# Patient Record
Sex: FEMALE | Race: BLACK OR AFRICAN AMERICAN | NOT HISPANIC OR LATINO | Employment: FULL TIME | URBAN - METROPOLITAN AREA
[De-identification: names, ages, dates, MRNs, and addresses within clinical notes are randomized per-mention and may not be internally consistent; named-entity substitution may affect disease eponyms.]

---

## 2023-03-01 LAB
ALANINE AMINOTRANSFERASE (SGPT) (U/L) IN SER/PLAS: 19 U/L (ref 7–45)
ALBUMIN (G/DL) IN SER/PLAS: 4 G/DL (ref 3.4–5)
ALKALINE PHOSPHATASE (U/L) IN SER/PLAS: 51 U/L (ref 33–110)
ANION GAP IN SER/PLAS: 12 MMOL/L (ref 10–20)
ASPARTATE AMINOTRANSFERASE (SGOT) (U/L) IN SER/PLAS: 13 U/L (ref 9–39)
BILIRUBIN TOTAL (MG/DL) IN SER/PLAS: 0.3 MG/DL (ref 0–1.2)
CALCIDIOL (25 OH VITAMIN D3) (NG/ML) IN SER/PLAS: 39 NG/ML
CALCIUM (MG/DL) IN SER/PLAS: 9.1 MG/DL (ref 8.6–10.6)
CARBON DIOXIDE, TOTAL (MMOL/L) IN SER/PLAS: 27 MMOL/L (ref 21–32)
CHLORIDE (MMOL/L) IN SER/PLAS: 104 MMOL/L (ref 98–107)
CHOLESTEROL (MG/DL) IN SER/PLAS: 192 MG/DL (ref 0–199)
CHOLESTEROL IN HDL (MG/DL) IN SER/PLAS: 66 MG/DL
CHOLESTEROL/HDL RATIO: 2.9
CREATININE (MG/DL) IN SER/PLAS: 1.1 MG/DL (ref 0.5–1.05)
ESTIMATED AVERAGE GLUCOSE FOR HBA1C: 111 MG/DL
GFR FEMALE: 66 ML/MIN/1.73M2
GLUCOSE (MG/DL) IN SER/PLAS: 98 MG/DL (ref 74–99)
HEMOGLOBIN A1C/HEMOGLOBIN TOTAL IN BLOOD: 5.5 %
LDL: 110 MG/DL (ref 0–99)
POTASSIUM (MMOL/L) IN SER/PLAS: 4.3 MMOL/L (ref 3.5–5.3)
PROTEIN TOTAL: 6.9 G/DL (ref 6.4–8.2)
SODIUM (MMOL/L) IN SER/PLAS: 139 MMOL/L (ref 136–145)
TRIGLYCERIDE (MG/DL) IN SER/PLAS: 79 MG/DL (ref 0–149)
UREA NITROGEN (MG/DL) IN SER/PLAS: 18 MG/DL (ref 6–23)
VLDL: 16 MG/DL (ref 0–40)

## 2023-03-10 ENCOUNTER — TELEPHONE (OUTPATIENT)
Dept: PRIMARY CARE | Facility: CLINIC | Age: 38
End: 2023-03-10
Payer: COMMERCIAL

## 2023-03-10 DIAGNOSIS — J45.909 ASTHMA, UNSPECIFIED ASTHMA SEVERITY, UNSPECIFIED WHETHER COMPLICATED, UNSPECIFIED WHETHER PERSISTENT (HHS-HCC): Primary | ICD-10-CM

## 2023-03-10 RX ORDER — NEBULIZER AND COMPRESSOR
1 EACH MISCELLANEOUS 2 TIMES DAILY PRN
Qty: 1 EACH | Refills: 0 | Status: SHIPPED | OUTPATIENT
Start: 2023-03-10

## 2023-05-03 ENCOUNTER — OFFICE VISIT (OUTPATIENT)
Dept: PRIMARY CARE | Facility: CLINIC | Age: 38
End: 2023-05-03
Payer: COMMERCIAL

## 2023-05-03 VITALS
WEIGHT: 265 LBS | SYSTOLIC BLOOD PRESSURE: 118 MMHG | OXYGEN SATURATION: 98 % | BODY MASS INDEX: 42.59 KG/M2 | HEART RATE: 78 BPM | DIASTOLIC BLOOD PRESSURE: 78 MMHG | HEIGHT: 66 IN

## 2023-05-03 DIAGNOSIS — R51.9 CHRONIC NONINTRACTABLE HEADACHE, UNSPECIFIED HEADACHE TYPE: ICD-10-CM

## 2023-05-03 DIAGNOSIS — G47.19 EXCESSIVE DAYTIME SLEEPINESS: ICD-10-CM

## 2023-05-03 DIAGNOSIS — R51.9 NONINTRACTABLE HEADACHE, UNSPECIFIED CHRONICITY PATTERN, UNSPECIFIED HEADACHE TYPE: Primary | ICD-10-CM

## 2023-05-03 DIAGNOSIS — R06.83 SNORING: ICD-10-CM

## 2023-05-03 DIAGNOSIS — I10 BENIGN ESSENTIAL HYPERTENSION: ICD-10-CM

## 2023-05-03 DIAGNOSIS — G89.29 CHRONIC NONINTRACTABLE HEADACHE, UNSPECIFIED HEADACHE TYPE: ICD-10-CM

## 2023-05-03 PROBLEM — B37.31 VAGINAL YEAST INFECTION: Status: RESOLVED | Noted: 2023-05-03 | Resolved: 2023-05-03

## 2023-05-03 PROBLEM — R53.83 FATIGUE: Status: ACTIVE | Noted: 2023-05-03

## 2023-05-03 PROBLEM — K43.9 VENTRAL HERNIA: Status: ACTIVE | Noted: 2023-05-03

## 2023-05-03 PROBLEM — K85.80 OTHER ACUTE PANCREATITIS WITHOUT NECROSIS OR INFECTION (HHS-HCC): Status: RESOLVED | Noted: 2023-05-03 | Resolved: 2023-05-03

## 2023-05-03 PROBLEM — E55.9 VITAMIN D DEFICIENCY: Status: ACTIVE | Noted: 2023-05-03

## 2023-05-03 PROBLEM — R06.2 WHEEZE: Status: ACTIVE | Noted: 2023-05-03

## 2023-05-03 PROBLEM — J45.909 ASTHMA (HHS-HCC): Status: ACTIVE | Noted: 2023-05-03

## 2023-05-03 PROBLEM — R73.03 PREDIABETES: Status: ACTIVE | Noted: 2023-05-03

## 2023-05-03 PROBLEM — L30.9 ECZEMA: Status: ACTIVE | Noted: 2023-05-03

## 2023-05-03 PROCEDURE — 3078F DIAST BP <80 MM HG: CPT | Performed by: PHYSICIAN ASSISTANT

## 2023-05-03 PROCEDURE — 3074F SYST BP LT 130 MM HG: CPT | Performed by: PHYSICIAN ASSISTANT

## 2023-05-03 PROCEDURE — 1036F TOBACCO NON-USER: CPT | Performed by: PHYSICIAN ASSISTANT

## 2023-05-03 PROCEDURE — 99214 OFFICE O/P EST MOD 30 MIN: CPT | Performed by: PHYSICIAN ASSISTANT

## 2023-05-03 RX ORDER — ERGOCALCIFEROL 1.25 MG/1
1 CAPSULE ORAL
COMMUNITY

## 2023-05-03 RX ORDER — AMLODIPINE BESYLATE 5 MG/1
5 TABLET ORAL DAILY
COMMUNITY
Start: 2023-04-06

## 2023-05-03 RX ORDER — MOMETASONE FUROATE AND FORMOTEROL FUMARATE DIHYDRATE 100; 5 UG/1; UG/1
2 AEROSOL RESPIRATORY (INHALATION)
COMMUNITY
End: 2024-01-05 | Stop reason: SDUPTHER

## 2023-05-03 RX ORDER — TRIAMCINOLONE ACETONIDE 1 MG/G
OINTMENT TOPICAL 2 TIMES DAILY
COMMUNITY
Start: 2022-07-15

## 2023-05-03 RX ORDER — ALBUTEROL SULFATE 90 UG/1
2 AEROSOL, METERED RESPIRATORY (INHALATION) 2 TIMES DAILY PRN
COMMUNITY
End: 2024-01-05 | Stop reason: SDUPTHER

## 2023-05-03 RX ORDER — ALBUTEROL SULFATE 0.83 MG/ML
2.5 SOLUTION RESPIRATORY (INHALATION) EVERY 4 HOURS PRN
COMMUNITY
End: 2024-01-05 | Stop reason: WASHOUT

## 2023-05-03 ASSESSMENT — PATIENT HEALTH QUESTIONNAIRE - PHQ9
1. LITTLE INTEREST OR PLEASURE IN DOING THINGS: NOT AT ALL
2. FEELING DOWN, DEPRESSED OR HOPELESS: NOT AT ALL
SUM OF ALL RESPONSES TO PHQ9 QUESTIONS 1 AND 2: 0

## 2023-05-03 NOTE — LETTER
May 3, 2023     Patient: Kate Carrion   YOB: 1985   Date of Visit: 5/3/2023       To Whom It May Concern:    Kate Carrion was seen in my clinic on 5/3/2023 at 9:00 am. Please excuse Kate for her absence from work on this day to make the appointment.    If you have any questions or concerns, please don't hesitate to call.         Sincerely,         Jennifer Truong PA-C

## 2023-05-03 NOTE — ASSESSMENT & PLAN NOTE
Home sleep study ordered for symptoms of excessive daytime sleepiness, snoring, dry mouth upon waking in the morning, almost daily morning headaches. Pending results, may refer to sleep medicine. Encouraged weight loss

## 2023-05-03 NOTE — PROGRESS NOTES
"Subjective   Katesa Carrion is a 37 y.o. female who presents for Follow-up (Still having h/a on and off).  MAXIME is a 37 y.o. female presenting for a follow up on her BP. Currently c/o:    HA: Occurring almost daily, in the frontal region usually. She typically wakes up with a vague headache that resolves over the course of the day.  Describes the headaches as unilateral (usually left), pressure or throbbing style pain. Has associated photophobia. She thinks it may be associated with her glasses or work. She has been evaluated by her eye doctor and everything was normal.  She currently takes Tylenol for headaches.    HTN: stable on amlodipine 5mg. Denies any dizziness, chest pain, SOB/KO, palpitations, LE edema. Recall she didn't tolerate Losartan d/t developing HA with use. She does check her BP at home, runs 100-120s/70-80s.     Asthma: compliant with albuterol inhaler/nebulizer and dulera inhaler. Denies any current cough, wheezing, shortness of breath, dyspnea on exertion, chest pain.    Prediabetes: Last a1c 5.7% 9/30/22. Stable.     12 point ROS reviewed and negative other than as stated in [facility]    /78   Pulse 78   Ht 1.676 m (5' 6\")   Wt 120 kg (265 lb)   SpO2 98%   BMI 42.77 kg/m²   Objective   Physical Exam  Vitals reviewed.   Constitutional:       General: She is not in acute distress.     Appearance: Normal appearance. She is not ill-appearing.   HENT:      Head: Normocephalic and atraumatic.   Eyes:      General: No scleral icterus.     Extraocular Movements: Extraocular movements intact.      Conjunctiva/sclera: Conjunctivae normal.      Pupils: Pupils are equal, round, and reactive to light.   Cardiovascular:      Rate and Rhythm: Normal rate and regular rhythm.      Heart sounds: Normal heart sounds. No murmur heard.     No friction rub. No gallop.   Pulmonary:      Effort: Pulmonary effort is normal. No respiratory distress.      Breath sounds: Normal breath sounds. No stridor. No wheezing, " rhonchi or rales.   Musculoskeletal:      Cervical back: Normal range of motion.      Right lower leg: No edema.      Left lower leg: No edema.   Skin:     General: Skin is warm and dry.   Neurological:      Mental Status: She is alert and oriented to person, place, and time. Mental status is at baseline.      Cranial Nerves: No cranial nerve deficit.      Gait: Gait normal.   Psychiatric:         Mood and Affect: Mood normal.         Behavior: Behavior normal.         Assessment/Plan   Problem List Items Addressed This Visit       Benign essential hypertension     Well controlled. Continue Amlodipine 5mg. Follow strict DASH diet and exercise as tolerated.          Nonintractable headache - Primary     Given presentation of vague daily headaches occurring upon waking, home sleep study ordered for further evaluation and to rule out sleep apnea.  Continue Tylenol as needed for headaches.  Referral to neurology headache clinic provided for further evaluation    Go to the ED with any severe sudden headaches, neurological deficits, or worst headaches of her life.         Relevant Orders    Referral to Neurology    Home sleep apnea test (HSAT)    Excessive daytime sleepiness     Home sleep study ordered for symptoms of excessive daytime sleepiness, snoring, dry mouth upon waking in the morning, almost daily morning headaches.  Pending results, may refer to sleep medicine.  Encouraged weight loss         Relevant Orders    Home sleep apnea test (HSAT)    Snoring     Home sleep study ordered for symptoms of excessive daytime sleepiness, snoring, dry mouth upon waking in the morning, almost daily morning headaches. Pending results, may refer to sleep medicine. Encouraged weight loss          Relevant Orders    Home sleep apnea test (HSAT)     Follow-up in 4 months or sooner as needed

## 2023-05-03 NOTE — ASSESSMENT & PLAN NOTE
Given presentation of vague daily headaches occurring upon waking, home sleep study ordered for further evaluation and to rule out sleep apnea.  Continue Tylenol as needed for headaches.  Referral to neurology headache clinic provided for further evaluation    Go to the ED with any severe sudden headaches, neurological deficits, or worst headaches of her life.

## 2023-08-10 ENCOUNTER — HOSPITAL ENCOUNTER (OUTPATIENT)
Dept: DATA CONVERSION | Facility: HOSPITAL | Age: 38
Discharge: HOME | End: 2023-08-10

## 2023-08-10 DIAGNOSIS — Z79.899 OTHER LONG TERM (CURRENT) DRUG THERAPY: ICD-10-CM

## 2023-08-10 DIAGNOSIS — I10 ESSENTIAL (PRIMARY) HYPERTENSION: ICD-10-CM

## 2023-08-10 LAB
ANION GAP SERPL CALCULATED.3IONS-SCNC: 8 MMOL/L (ref 0–19)
BACTERIA UR QL AUTO: NEGATIVE
BILIRUB UR QL STRIP.AUTO: NEGATIVE
BUN SERPL-MCNC: 11 MG/DL (ref 8–25)
BUN/CREAT SERPL: 11 RATIO (ref 8–21)
CALCIUM SERPL-MCNC: 9.2 MG/DL (ref 8.5–10.4)
CHLORIDE SERPL-SCNC: 101 MMOL/L (ref 97–107)
CLARITY UR: CLEAR
CO2 SERPL-SCNC: 26 MMOL/L (ref 24–31)
COLOR UR: ABNORMAL
CREAT SERPL-MCNC: 1 MG/DL (ref 0.4–1.6)
DEPRECATED RDW RBC AUTO: 45.1 FL (ref 37–54)
ERYTHROCYTE [DISTWIDTH] IN BLOOD BY AUTOMATED COUNT: 14.6 % (ref 11.7–15)
GFR SERPL CREATININE-BSD FRML MDRD: 74 ML/MIN/1.73 M2
GLUCOSE SERPL-MCNC: 98 MG/DL (ref 65–99)
GLUCOSE UR STRIP.AUTO-MCNC: NEGATIVE MG/DL
HCT VFR BLD AUTO: 40.3 % (ref 36–44)
HGB BLD-MCNC: 13 GM/DL (ref 12–15)
HGB UR QL STRIP.AUTO: 7 /HPF (ref 0–3)
HGB UR QL: ABNORMAL
HYALINE CASTS UR QL AUTO: ABNORMAL /LPF
KETONES UR QL STRIP.AUTO: NEGATIVE
LEUKOCYTE ESTERASE UR QL STRIP.AUTO: NEGATIVE
MCH RBC QN AUTO: 27 PG (ref 26–34)
MCHC RBC AUTO-ENTMCNC: 32.3 % (ref 31–37)
MCV RBC AUTO: 83.8 FL (ref 80–100)
MICROSCOPIC (UA): ABNORMAL
NITRITE UR QL STRIP.AUTO: NEGATIVE
NRBC BLD-RTO: 0 /100 WBC
PH UR STRIP.AUTO: 6 [PH] (ref 4.6–8)
PLATELET # BLD AUTO: 282 K/UL (ref 150–450)
PMV BLD AUTO: 10.5 CU (ref 7–12.6)
POTASSIUM SERPL-SCNC: 4.1 MMOL/L (ref 3.4–5.1)
PROT UR STRIP.AUTO-MCNC: NEGATIVE MG/DL
RBC # BLD AUTO: 4.81 M/UL (ref 4–4.9)
SODIUM SERPL-SCNC: 135 MMOL/L (ref 133–145)
SP GR UR STRIP.AUTO: 1.02 (ref 1–1.03)
SQUAMOUS UR QL AUTO: ABNORMAL /HPF
URINE CULTURE: ABNORMAL
UROBILINOGEN UR QL STRIP.AUTO: NORMAL MG/DL (ref 0–1)
WBC # BLD AUTO: 7.2 K/UL (ref 4.5–11)
WBC #/AREA URNS AUTO: 2 /HPF (ref 0–3)

## 2023-09-05 ENCOUNTER — APPOINTMENT (OUTPATIENT)
Dept: PRIMARY CARE | Facility: CLINIC | Age: 38
End: 2023-09-05
Payer: COMMERCIAL

## 2023-09-14 ENCOUNTER — HOSPITAL ENCOUNTER (OUTPATIENT)
Dept: DATA CONVERSION | Facility: HOSPITAL | Age: 38
Discharge: HOME | End: 2023-09-14
Payer: COMMERCIAL

## 2023-09-14 DIAGNOSIS — E55.9 VITAMIN D DEFICIENCY, UNSPECIFIED: ICD-10-CM

## 2023-09-14 LAB — 25(OH)D3 SERPL-MCNC: 33 NG/ML (ref 31–100)

## 2023-09-21 PROBLEM — E66.01 MORBID OBESITY (MULTI): Status: ACTIVE | Noted: 2023-09-21

## 2023-09-21 PROBLEM — G43.109 MIGRAINE WITH AURA, NOT INTRACTABLE, WITHOUT STATUS MIGRAINOSUS: Status: ACTIVE | Noted: 2023-09-21

## 2023-09-21 PROBLEM — K43.6 EPIGASTRIC HERNIA WITH OBSTRUCTION: Status: ACTIVE | Noted: 2023-09-21

## 2023-09-21 PROBLEM — G47.00 INSOMNIA: Status: ACTIVE | Noted: 2023-09-21

## 2023-09-21 PROBLEM — R20.2 PARESTHESIA OF BOTH HANDS: Status: ACTIVE | Noted: 2023-09-21

## 2023-09-21 PROBLEM — H01.8 OTHER SPECIFIED INFLAMMATIONS OF EYELID: Status: ACTIVE | Noted: 2023-09-21

## 2023-09-21 PROBLEM — E78.49 OTHER HYPERLIPIDEMIA: Status: ACTIVE | Noted: 2023-09-21

## 2023-09-21 PROBLEM — H04.123 DRY EYE SYNDROME OF BILATERAL LACRIMAL GLANDS: Status: ACTIVE | Noted: 2023-09-21

## 2023-09-21 PROBLEM — N92.1 MENORRHAGIA WITH IRREGULAR CYCLE: Status: ACTIVE | Noted: 2023-09-21

## 2023-09-21 PROBLEM — H52.7 DISORDER OF REFRACTION: Status: ACTIVE | Noted: 2023-09-21

## 2023-09-21 PROBLEM — D21.9 FIBROIDS: Status: ACTIVE | Noted: 2023-09-21

## 2023-09-21 PROBLEM — R30.0 DYSURIA: Status: ACTIVE | Noted: 2023-09-21

## 2023-09-21 PROBLEM — N92.6 IRREGULAR MENSTRUAL CYCLE: Status: ACTIVE | Noted: 2023-09-21

## 2023-09-21 PROBLEM — I10 HYPERTENSION: Status: ACTIVE | Noted: 2023-09-21

## 2023-09-21 PROBLEM — F10.11 HISTORY OF ALCOHOL ABUSE: Status: ACTIVE | Noted: 2023-09-21

## 2023-09-21 PROBLEM — N76.1 SUBACUTE VAGINITIS: Status: ACTIVE | Noted: 2023-09-21

## 2023-09-21 RX ORDER — HYDROCHLOROTHIAZIDE 12.5 MG/1
1 TABLET ORAL DAILY
COMMUNITY

## 2023-09-21 RX ORDER — TRIAMTERENE/HYDROCHLOROTHIAZID 37.5-25 MG
1 TABLET ORAL DAILY
COMMUNITY
Start: 2023-08-11

## 2023-09-21 RX ORDER — PROPRANOLOL HYDROCHLORIDE 40 MG/1
1 TABLET ORAL DAILY
COMMUNITY
Start: 2023-07-14 | End: 2024-01-18 | Stop reason: WASHOUT

## 2023-09-21 RX ORDER — TRIAMCINOLONE ACETONIDE 5 MG/G
1 CREAM TOPICAL 2 TIMES DAILY
COMMUNITY
Start: 2021-08-19

## 2023-09-21 RX ORDER — NORTRIPTYLINE HYDROCHLORIDE 10 MG/1
1 CAPSULE ORAL DAILY
COMMUNITY
Start: 2023-05-18

## 2023-09-21 RX ORDER — PROPRANOLOL HYDROCHLORIDE 20 MG/1
1 TABLET ORAL DAILY
COMMUNITY
Start: 2023-06-19 | End: 2024-01-18 | Stop reason: WASHOUT

## 2024-01-05 DIAGNOSIS — Z76.0 PRESCRIPTION REFILL: ICD-10-CM

## 2024-01-05 RX ORDER — MOMETASONE FUROATE AND FORMOTEROL FUMARATE DIHYDRATE 100; 5 UG/1; UG/1
2 AEROSOL RESPIRATORY (INHALATION)
Qty: 13 G | Refills: 3 | Status: SHIPPED | OUTPATIENT
Start: 2024-01-05 | End: 2024-01-10 | Stop reason: SDUPTHER

## 2024-01-05 RX ORDER — ALBUTEROL SULFATE 90 UG/1
2 AEROSOL, METERED RESPIRATORY (INHALATION) 2 TIMES DAILY PRN
Qty: 18 G | Refills: 3 | Status: SHIPPED | OUTPATIENT
Start: 2024-01-05 | End: 2024-01-10 | Stop reason: SDUPTHER

## 2024-01-05 NOTE — TELEPHONE ENCOUNTER
PT requesting refill on albuterol inhaler. Please send to [facility] on [address]    PT also requesting dulera inhaler-states this should be send to [facility] pharmacy.

## 2024-01-10 DIAGNOSIS — Z76.0 PRESCRIPTION REFILL: ICD-10-CM

## 2024-01-10 RX ORDER — ALBUTEROL SULFATE 90 UG/1
2 AEROSOL, METERED RESPIRATORY (INHALATION) 2 TIMES DAILY PRN
Qty: 18 G | Refills: 3 | Status: SHIPPED | OUTPATIENT
Start: 2024-01-10

## 2024-01-10 RX ORDER — MOMETASONE FUROATE AND FORMOTEROL FUMARATE DIHYDRATE 100; 5 UG/1; UG/1
2 AEROSOL RESPIRATORY (INHALATION)
Qty: 13 G | Refills: 3 | Status: SHIPPED | OUTPATIENT
Start: 2024-01-10 | End: 2024-01-18 | Stop reason: SDUPTHER

## 2024-01-10 NOTE — TELEPHONE ENCOUNTER
Patient called in requesting the dulera to be sent to [facility]. [facility] [place] [facility] [address]. They need to go to different pharmacies due to insurance coverage.

## 2024-01-15 NOTE — PROGRESS NOTES
This is a 38 year old female for FU and MED REVIEW and general FU visit for HTN and other meds due to had HA from PROPRANOLOL (unlikely due to used to treat Has but ALSO has NOTE FROM PHARMACY indicating she should not take PROPRANOLOL due to ASTHMA and this is valid.    HOWEVER, PHARM note recommend ACE inhibitor but THIS IS CONTRAINDICATED due to PT IS  and has RISK of ANGIOEDEMA with ACE so will instead try ARB LOSARTAN BUT BP is PERFECT TODAY and we will HOLD OFF on any NEW BP meds     STOPPED PROPRANOLOL in early January due to dizziness and sleepiness.    SHE states she needs MED REFILL of DULERA to be sent to [facility]        ROS is NEG for HEADACHE, NAUSEA, VOMITING, DIARRHEA, CHEST PAIN, SOB, and BLEEDING and as further REVIEWED BELOW.    Malik Carrion is a 38 y.o. female who presents for Medication Problem.        HPI:    See above      Review of systems is essentially negative for all systems except for any identified issues in HPI above.    Objective     /74   Pulse 100   Temp 36.4 °C (97.6 °F) (Temporal)   Wt 112 kg (246 lb)   SpO2 99%   BMI 38.53 kg/m²      Physical Examination:       GENERAL           General Appearance: well-appearing, well-developed, well-hydrated, well-nourished, no acute distress.        HEENT           NECK supple, no masses or thyromegaly, no carotid bruit.        EYES           Extraocular Movements: normal, bilateral eyes KELLEY, no conjunctival injection.        HEART           Rate and Rhythm regular rate and rhythm. Heart sounds: normal S1S2, no S3 or S4. Murmurs: none.        CHEST           Breath sounds: Clear to IPPA, RR<16 no use of accessory muscles.        ABDOMEN           General: Neg for LKKS or masses, no scleral icterus or jaundice.        MUSCULOSKELETAL           Joints Demonstration: Neg for erythema, swelling or joint deformities. gross abnormalities no gross abnormalities.        EXTREMITIES           Lower  Extremities: Neg for cyanosis, clubbing or edema.       Assessment/Plan   Problem List Items Addressed This Visit       Benign essential hypertension - Primary    Nonintractable headache    Excessive daytime sleepiness    Snoring     Other Visit Diagnoses       Prescription refill                FOLLOW UP:  PRN and as specified above         Mi Dietrich M.D.

## 2024-01-18 ENCOUNTER — OFFICE VISIT (OUTPATIENT)
Dept: PRIMARY CARE | Facility: CLINIC | Age: 39
End: 2024-01-18
Payer: COMMERCIAL

## 2024-01-18 VITALS
OXYGEN SATURATION: 99 % | DIASTOLIC BLOOD PRESSURE: 74 MMHG | WEIGHT: 246 LBS | SYSTOLIC BLOOD PRESSURE: 130 MMHG | TEMPERATURE: 97.6 F | HEART RATE: 100 BPM | BODY MASS INDEX: 38.53 KG/M2

## 2024-01-18 DIAGNOSIS — R06.83 SNORING: ICD-10-CM

## 2024-01-18 DIAGNOSIS — R51.9 CHRONIC NONINTRACTABLE HEADACHE, UNSPECIFIED HEADACHE TYPE: ICD-10-CM

## 2024-01-18 DIAGNOSIS — I10 BENIGN ESSENTIAL HYPERTENSION: Primary | ICD-10-CM

## 2024-01-18 DIAGNOSIS — G89.29 CHRONIC NONINTRACTABLE HEADACHE, UNSPECIFIED HEADACHE TYPE: ICD-10-CM

## 2024-01-18 DIAGNOSIS — G47.19 EXCESSIVE DAYTIME SLEEPINESS: ICD-10-CM

## 2024-01-18 DIAGNOSIS — Z76.0 PRESCRIPTION REFILL: ICD-10-CM

## 2024-01-18 DIAGNOSIS — J45.909 UNCOMPLICATED ASTHMA, UNSPECIFIED ASTHMA SEVERITY, UNSPECIFIED WHETHER PERSISTENT (HHS-HCC): ICD-10-CM

## 2024-01-18 PROCEDURE — 3008F BODY MASS INDEX DOCD: CPT | Performed by: FAMILY MEDICINE

## 2024-01-18 PROCEDURE — 3078F DIAST BP <80 MM HG: CPT | Performed by: FAMILY MEDICINE

## 2024-01-18 PROCEDURE — 99214 OFFICE O/P EST MOD 30 MIN: CPT | Performed by: FAMILY MEDICINE

## 2024-01-18 PROCEDURE — 1036F TOBACCO NON-USER: CPT | Performed by: FAMILY MEDICINE

## 2024-01-18 PROCEDURE — 3075F SYST BP GE 130 - 139MM HG: CPT | Performed by: FAMILY MEDICINE

## 2024-01-18 RX ORDER — MOMETASONE FUROATE AND FORMOTEROL FUMARATE DIHYDRATE 100; 5 UG/1; UG/1
2 AEROSOL RESPIRATORY (INHALATION)
Qty: 13 G | Refills: 3 | Status: SHIPPED | OUTPATIENT
Start: 2024-01-18

## 2024-01-18 ASSESSMENT — PAIN SCALES - GENERAL: PAINLEVEL: 0-NO PAIN

## 2024-03-11 ENCOUNTER — TELEPHONE (OUTPATIENT)
Dept: PRIMARY CARE | Facility: CLINIC | Age: 39
End: 2024-03-11
Payer: COMMERCIAL

## 2024-03-11 NOTE — TELEPHONE ENCOUNTER
PT requesting refill for RX dulera.  PT requesting RX be sent to [facility] pharmacy.

## 2024-03-11 NOTE — TELEPHONE ENCOUNTER
PT states previous RX that was sent was given in October and PT had it sent to [facility] pharmacy and got a discount through some program.  PT went to  RX today and was told that that program no longer exists and her RX would cost over $350.  PT wants to know if there is a different RX or if she could be tested and be taken off this RX.

## 2024-06-12 ENCOUNTER — LAB (OUTPATIENT)
Dept: LAB | Facility: LAB | Age: 39
End: 2024-06-12
Payer: COMMERCIAL

## 2024-06-12 ENCOUNTER — OFFICE VISIT (OUTPATIENT)
Dept: PRIMARY CARE | Facility: CLINIC | Age: 39
End: 2024-06-12
Payer: COMMERCIAL

## 2024-06-12 VITALS
WEIGHT: 262 LBS | HEART RATE: 98 BPM | BODY MASS INDEX: 41.04 KG/M2 | OXYGEN SATURATION: 100 % | TEMPERATURE: 97 F | DIASTOLIC BLOOD PRESSURE: 80 MMHG | SYSTOLIC BLOOD PRESSURE: 128 MMHG

## 2024-06-12 DIAGNOSIS — Z79.899 ENCOUNTER FOR MEDICATION REVIEW: ICD-10-CM

## 2024-06-12 DIAGNOSIS — R53.83 OTHER FATIGUE: ICD-10-CM

## 2024-06-12 DIAGNOSIS — E55.9 VITAMIN D DEFICIENCY: ICD-10-CM

## 2024-06-12 DIAGNOSIS — E55.9 VITAMIN D DEFICIENCY: Primary | ICD-10-CM

## 2024-06-12 DIAGNOSIS — Z13.9 SCREENING DUE: ICD-10-CM

## 2024-06-12 DIAGNOSIS — Z86.16 HISTORY OF COVID-19: ICD-10-CM

## 2024-06-12 LAB
25(OH)D3 SERPL-MCNC: 12 NG/ML (ref 31–100)
ANION GAP SERPL CALC-SCNC: 12 MMOL/L
APPEARANCE UR: CLEAR
BASOPHILS # BLD AUTO: 0.06 X10*3/UL (ref 0–0.1)
BASOPHILS NFR BLD AUTO: 0.9 %
BILIRUB UR STRIP.AUTO-MCNC: NEGATIVE MG/DL
BUN SERPL-MCNC: 13 MG/DL (ref 8–25)
CALCIUM SERPL-MCNC: 9.2 MG/DL (ref 8.5–10.4)
CHLORIDE SERPL-SCNC: 103 MMOL/L (ref 97–107)
CO2 SERPL-SCNC: 24 MMOL/L (ref 24–31)
COLOR UR: YELLOW
CREAT SERPL-MCNC: 1 MG/DL (ref 0.4–1.6)
EGFRCR SERPLBLD CKD-EPI 2021: 74 ML/MIN/1.73M*2
EOSINOPHIL # BLD AUTO: 0.22 X10*3/UL (ref 0–0.7)
EOSINOPHIL NFR BLD AUTO: 3.4 %
ERYTHROCYTE [DISTWIDTH] IN BLOOD BY AUTOMATED COUNT: 14.9 % (ref 11.5–14.5)
EST. AVERAGE GLUCOSE BLD GHB EST-MCNC: 114 MG/DL
GLUCOSE SERPL-MCNC: 94 MG/DL (ref 65–99)
GLUCOSE UR STRIP.AUTO-MCNC: NORMAL MG/DL
HBA1C MFR BLD: 5.6 %
HCT VFR BLD AUTO: 40.5 % (ref 36–46)
HETEROPH AB SERPLBLD QL IA.RAPID: NEGATIVE
HGB BLD-MCNC: 13.1 G/DL (ref 12–16)
HOLD SPECIMEN: NORMAL
IMM GRANULOCYTES # BLD AUTO: 0.02 X10*3/UL (ref 0–0.7)
IMM GRANULOCYTES NFR BLD AUTO: 0.3 % (ref 0–0.9)
KETONES UR STRIP.AUTO-MCNC: NEGATIVE MG/DL
LEUKOCYTE ESTERASE UR QL STRIP.AUTO: NEGATIVE
LYMPHOCYTES # BLD AUTO: 2.06 X10*3/UL (ref 1.2–4.8)
LYMPHOCYTES NFR BLD AUTO: 32.3 %
MCH RBC QN AUTO: 27 PG (ref 26–34)
MCHC RBC AUTO-ENTMCNC: 32.3 G/DL (ref 32–36)
MCV RBC AUTO: 84 FL (ref 80–100)
MONOCYTES # BLD AUTO: 0.56 X10*3/UL (ref 0.1–1)
MONOCYTES NFR BLD AUTO: 8.8 %
MUCOUS THREADS #/AREA URNS AUTO: NORMAL /LPF
NEUTROPHILS # BLD AUTO: 3.46 X10*3/UL (ref 1.2–7.7)
NEUTROPHILS NFR BLD AUTO: 54.3 %
NITRITE UR QL STRIP.AUTO: NEGATIVE
NRBC BLD-RTO: 0 /100 WBCS (ref 0–0)
PH UR STRIP.AUTO: 6 [PH]
PLATELET # BLD AUTO: 294 X10*3/UL (ref 150–450)
POTASSIUM SERPL-SCNC: 4 MMOL/L (ref 3.4–5.1)
PROT UR STRIP.AUTO-MCNC: NORMAL MG/DL
RBC # BLD AUTO: 4.85 X10*6/UL (ref 4–5.2)
RBC # UR STRIP.AUTO: NEGATIVE /UL
RBC #/AREA URNS AUTO: NORMAL /HPF
SODIUM SERPL-SCNC: 139 MMOL/L (ref 133–145)
SP GR UR STRIP.AUTO: 1.03
SQUAMOUS #/AREA URNS AUTO: NORMAL /HPF
TSH SERPL DL<=0.05 MIU/L-ACNC: 1.39 MIU/L (ref 0.27–4.2)
UROBILINOGEN UR STRIP.AUTO-MCNC: NORMAL MG/DL
WBC # BLD AUTO: 6.4 X10*3/UL (ref 4.4–11.3)
WBC #/AREA URNS AUTO: NORMAL /HPF

## 2024-06-12 PROCEDURE — 82306 VITAMIN D 25 HYDROXY: CPT

## 2024-06-12 PROCEDURE — 99214 OFFICE O/P EST MOD 30 MIN: CPT | Performed by: FAMILY MEDICINE

## 2024-06-12 PROCEDURE — 1036F TOBACCO NON-USER: CPT | Performed by: FAMILY MEDICINE

## 2024-06-12 PROCEDURE — 86665 EPSTEIN-BARR CAPSID VCA: CPT

## 2024-06-12 PROCEDURE — 36415 COLL VENOUS BLD VENIPUNCTURE: CPT

## 2024-06-12 PROCEDURE — 81001 URINALYSIS AUTO W/SCOPE: CPT

## 2024-06-12 PROCEDURE — 85025 COMPLETE CBC W/AUTO DIFF WBC: CPT

## 2024-06-12 PROCEDURE — 3079F DIAST BP 80-89 MM HG: CPT | Performed by: FAMILY MEDICINE

## 2024-06-12 PROCEDURE — 84443 ASSAY THYROID STIM HORMONE: CPT

## 2024-06-12 PROCEDURE — 3008F BODY MASS INDEX DOCD: CPT | Performed by: FAMILY MEDICINE

## 2024-06-12 PROCEDURE — 83036 HEMOGLOBIN GLYCOSYLATED A1C: CPT

## 2024-06-12 PROCEDURE — 80048 BASIC METABOLIC PNL TOTAL CA: CPT

## 2024-06-12 PROCEDURE — 86803 HEPATITIS C AB TEST: CPT

## 2024-06-12 PROCEDURE — 3074F SYST BP LT 130 MM HG: CPT | Performed by: FAMILY MEDICINE

## 2024-06-12 PROCEDURE — 86308 HETEROPHILE ANTIBODY SCREEN: CPT

## 2024-06-12 PROCEDURE — 86664 EPSTEIN-BARR NUCLEAR ANTIGEN: CPT

## 2024-06-12 PROCEDURE — 86663 EPSTEIN-BARR ANTIBODY: CPT

## 2024-06-12 ASSESSMENT — PAIN SCALES - GENERAL: PAINLEVEL: 0-NO PAIN

## 2024-06-12 ASSESSMENT — PATIENT HEALTH QUESTIONNAIRE - PHQ9
SUM OF ALL RESPONSES TO PHQ9 QUESTIONS 1 AND 2: 0
2. FEELING DOWN, DEPRESSED OR HOPELESS: NOT AT ALL
1. LITTLE INTEREST OR PLEASURE IN DOING THINGS: NOT AT ALL

## 2024-06-12 NOTE — LETTER
June 12, 2024     Patient: Kate Carrion   YOB: 1985   Date of Visit: 6/12/2024       To Whom It May Concern:    Kate Carrion was seen in my clinic on 6/12/2024 at 10:45 am. Please excuse Kate for her absence from work on this day to make the appointment.    If you have any questions or concerns, please don't hesitate to call.         Sincerely,         Mi Rodarte MD

## 2024-06-12 NOTE — PROGRESS NOTES
This is a 38 year old female for MED REVIEW for EVAL of LOW BP on DIURETIC and OTHER BP meds but BP is GOOD TODAY and not too low;  having  SLEEPINESS; and ASTHMATIC condition currently STABLE    She and I have together reviewed her MEDS today and removed ones no longer taking or duplicated.    Has had one episode of COVID 19 approximately 2022    FOR FATIGUE we will do LABS TODAY            ROS is NEG for HEADACHE, NAUSEA, VOMITING, DIARRHEA, CHEST PAIN, SOB, and BLEEDING and as further REVIEWED BELOW.    Malik Carrion is a 38 y.o. female who presents for Med review (Low bp/sleeping a lot).    HPI:    Per nursing intake, pt here for Med review (Low bp/sleeping a lot)       Review of systems is essentially negative for all systems except for any identified issues in HPI above.    Objective     /80   Pulse 98   Temp 36.1 °C (97 °F)   Wt 119 kg (262 lb)   SpO2 100%   BMI 41.04 kg/m²      Malik Carrion is a 38 y.o. female who presents for Med review (Low bp/sleeping a lot).    HPI:    Per nursing intake, pt here for Med review (Low bp/sleeping a lot)       Review of systems is essentially negative for all systems except for any identified issues in HPI above.    Objective     /80   Pulse 98   Temp 36.1 °C (97 °F)   Wt 119 kg (262 lb)   SpO2 100%   BMI 41.04 kg/m²      Physical Examination:       GENERAL           General Appearance: well-appearing, well-developed, well-hydrated, well-nourished, no acute distress.        HEENT           NECK supple, no masses or thyromegaly, no carotid bruit.        EYES           Extraocular Movements: normal, bilateral eyes KELLEY, no conjunctival injection.        HEART           Rate and Rhythm regular rate and rhythm. Heart sounds: normal S1S2, no S3 or S4. Murmurs: none.        CHEST           Breath sounds: Clear to IPPA, RR<16 no use of accessory muscles.        ABDOMEN           General: Neg for LKKS or masses, no scleral  icterus or jaundice.        MUSCULOSKELETAL           Joints Demonstration: Neg for erythema, swelling or joint deformities. gross abnormalities no gross abnormalities.        EXTREMITIES           Lower Extremities: Neg for cyanosis, clubbing or edema.       Assessment/Plan   Problem List Items Addressed This Visit    None      FOLLOW UP:  PRN and as specified above         Mi Dietrich M.D.     Assessment/Plan   Problem List Items Addressed This Visit    None      FOLLOW UP:   YEARLY for ROUTINE MEDICAL and PRN for other issues as discussed in visit         Mi Dietrich M.D.

## 2024-06-13 ENCOUNTER — HOSPITAL ENCOUNTER (EMERGENCY)
Facility: HOSPITAL | Age: 39
Discharge: HOME | End: 2024-06-13
Payer: COMMERCIAL

## 2024-06-13 VITALS
RESPIRATION RATE: 18 BRPM | HEIGHT: 66 IN | OXYGEN SATURATION: 99 % | BODY MASS INDEX: 42.11 KG/M2 | WEIGHT: 262 LBS | SYSTOLIC BLOOD PRESSURE: 104 MMHG | DIASTOLIC BLOOD PRESSURE: 78 MMHG | HEART RATE: 97 BPM | TEMPERATURE: 97.9 F

## 2024-06-13 DIAGNOSIS — R44.8 FACIAL PRESSURE: Primary | ICD-10-CM

## 2024-06-13 DIAGNOSIS — R53.83 OTHER FATIGUE: ICD-10-CM

## 2024-06-13 LAB
EBV EA IGG SER QL: NEGATIVE
EBV NA AB SER QL: POSITIVE
EBV VCA IGG SER IA-ACNC: POSITIVE
EBV VCA IGM SER IA-ACNC: NEGATIVE
HCV AB SER QL: NONREACTIVE

## 2024-06-13 PROCEDURE — 99283 EMERGENCY DEPT VISIT LOW MDM: CPT

## 2024-06-13 PROCEDURE — 2500000001 HC RX 250 WO HCPCS SELF ADMINISTERED DRUGS (ALT 637 FOR MEDICARE OP): Performed by: PHYSICIAN ASSISTANT

## 2024-06-13 PROCEDURE — 2500000004 HC RX 250 GENERAL PHARMACY W/ HCPCS (ALT 636 FOR OP/ED): Performed by: PHYSICIAN ASSISTANT

## 2024-06-13 RX ORDER — DOXYCYCLINE 100 MG/1
100 CAPSULE ORAL ONCE
Status: COMPLETED | OUTPATIENT
Start: 2024-06-13 | End: 2024-06-13

## 2024-06-13 RX ORDER — PREDNISONE 20 MG/1
60 TABLET ORAL ONCE
Status: COMPLETED | OUTPATIENT
Start: 2024-06-13 | End: 2024-06-13

## 2024-06-13 RX ORDER — DOXYCYCLINE 100 MG/1
100 CAPSULE ORAL 2 TIMES DAILY
Qty: 20 CAPSULE | Refills: 0 | Status: SHIPPED | OUTPATIENT
Start: 2024-06-13 | End: 2024-06-23

## 2024-06-13 RX ORDER — METHYLPREDNISOLONE 4 MG/1
TABLET ORAL
Qty: 21 TABLET | Refills: 0 | Status: SHIPPED | OUTPATIENT
Start: 2024-06-13 | End: 2024-06-20

## 2024-06-13 ASSESSMENT — COLUMBIA-SUICIDE SEVERITY RATING SCALE - C-SSRS
1. IN THE PAST MONTH, HAVE YOU WISHED YOU WERE DEAD OR WISHED YOU COULD GO TO SLEEP AND NOT WAKE UP?: NO
2. HAVE YOU ACTUALLY HAD ANY THOUGHTS OF KILLING YOURSELF?: NO
6. HAVE YOU EVER DONE ANYTHING, STARTED TO DO ANYTHING, OR PREPARED TO DO ANYTHING TO END YOUR LIFE?: NO

## 2024-06-13 ASSESSMENT — PAIN DESCRIPTION - PAIN TYPE: TYPE: ACUTE PAIN

## 2024-06-13 ASSESSMENT — PAIN - FUNCTIONAL ASSESSMENT: PAIN_FUNCTIONAL_ASSESSMENT: 0-10

## 2024-06-13 ASSESSMENT — PAIN SCALES - GENERAL: PAINLEVEL_OUTOF10: 0 - NO PAIN

## 2024-06-13 NOTE — Clinical Note
Kate Carrion was seen and treated in our emergency department on 6/13/2024.  She may return to work on 06/16/2024.       If you have any questions or concerns, please don't hesitate to call.      Suraj Parks PA-C

## 2024-06-14 NOTE — PROGRESS NOTES
This is a 38 year old female for FU to EBV results showing RECENT MONO LIKE EBV illness:  THIS PANEL SHOWS PAST INFECTION DUE TO IGM NEG    THIS IS A HOSP FU VISIT FROM JUNE 13TH FOR fatigue AND NOW HAS COUGH AND CAUSING EMESIS AND PRESENTS WITH FEVER AND SORE THROAT MUCOUS  DENIES SOB OR CP AND WAS SET FOR VISIT FOR EBV NOT A RECENT ILLNESS PER LABS ABOVE REVIEWED BUT BECAME ILL THIS MORNING AT 2 AM     SHE IS IMMUNIZED FOR COVID   DUE TO FEVER COUGH AND NAUSEA AND FATIGUE AND SORE THROAT WE WILL DO BOTH STREP AND COVID TESTING TODAY    IF SOB OR CP OCCURS TO REPORT IMMEDIATELY TO ER      ntains abnormal data Temi-Barr virus VCA antibody panel  Order: [id number]   Status: Final result       Visible to patient: No (inaccessible in [facility])       Dx: Other fatigue    2 Result Notes      Component  Ref Range & Units 2 d ago   EBV VCA, IgG  Negative Positive Abnormal    EBV VCA, IgM  Negative Negative   EBV Early Antigen Antibody, IgG  Negative Negative   EBV Nuclear Antigen Antibody, IgG  Negative Positive Abnormal    Resulting Agency [facility]              Narrative  Performed by: [facility]  EBV INTERPRETATION CHART    PRIMARY ACUTE  VCA-IGG: +/-  VCA-IGM: +/-  EA-IGG:  +/-  NA-IGG:   -    LATE ACUTE  VCA-IGG:  +  VCA-IGM: +/-  EA-IGG:  +/-  NA-IGG:  +/-    RECOVERING  VCA-IGG:  +  VCA-IGM:  -  EA-IGG:   +  NA-IGG:   -    PREVIOUS INFECTION  VCA-IGG:  +  VCA-IGM:  -  EA-IGG:   -  NA-IGG:  +/-      Specimen Collected: 06/12/24 12:00 Last Resulted: 06/13/24 11:57        Lab Flowsheet        Order Details        View Encounter        Lab and Collection Details        Routing        Result History     View All Conversations on this Encounter           Result Care Coordination      Result Notes     Lizbeth Kohler MA  6/13/2024  3:12 PM EDT Back to Top      Informed and appt made    Mi Rodarte MD  6/13/2024 12:00 PM EDT       EBV panel POS for MONO LIKE illness reasonably recent  OFFER a DEDICATED FU if she wishes                ROS is NEG for HEADACHE, NAUSEA, VOMITING, DIARRHEA, CHEST PAIN, SOB, and BLEEDING and as further REVIEWED BELOW.    Subjective   Kate Carrion is a 38 y.o. female who presents for Follow-up (Discuss test results ) and FYI (Pt ended up going to ER on 6/13 for fatigue/not feeling well. ER MD prescribed her steroids and antibiotics. Pt said she still isn't feeling 100%. Throat still sore. ).    HPI:    Per nursing intake, pt here for Follow-up (Discuss test results ) and FYI (Pt ended up going to ER on 6/13 for fatigue/not feeling well. ER MD prescribed her steroids and antibiotics. Pt said she still isn't feeling 100%. Throat still sore. )       Review of systems is essentially negative for all systems except for any identified issues in HPI above.    Objective     /76   Pulse 96   Temp (!) 38.5 °C (101.3 °F)   Wt 119 kg (262 lb)   SpO2 97%   BMI 42.29 kg/m²      Physical Examination:       GENERAL           General Appearance: well-appearing, well-developed, well-hydrated, well-nourished, no acute distress.        HEENT           NECK supple, no masses or thyromegaly, no carotid bruit.        EYES           Extraocular Movements: normal, bilateral eyes KELLEY, no conjunctival injection.        HEART           Rate and Rhythm regular rate and rhythm. Heart sounds: normal S1S2, no S3 or S4. Murmurs: none.        CHEST           Breath sounds: Clear to IPPA, RR<16 no use of accessory muscles.        ABDOMEN           General: Neg for LKKS or masses, no scleral icterus or jaundice.        MUSCULOSKELETAL           Joints Demonstration: Neg for erythema, swelling or joint deformities. gross abnormalities no gross abnormalities.        EXTREMITIES           Lower Extremities: Neg for cyanosis, clubbing or edema.       Assessment/Plan   DUE TO FEVER and COUGH and FATIGUE and SORE THROAT we are testing for both COVID and STREP.  We have asked you to remain off work until Monday to rest and have  fluids and tylenol or advil and to get a CXR today to rule out bronchitis/pneumonia  We are concerned this may be a mild case of COVID and you are immunized; but if the PCR test is positive we can offer you paxlovid treatment.  Please do not hesitate to report to ER of your choice if CHEST PAIN or SHORTNESS of breath or worsening symptoms occur.    STAFF please provide a note for her to remain off work until Monday for an infectious condition.    Problem List Items Addressed This Visit       Fatigue - Primary     Other Visit Diagnoses       History of COVID-19        EBV (Temi-Barr virus) viremia        Sore throat        Fever, unspecified fever cause        Subacute cough                FOLLOW UP:  PRN and as specified above         Mi Dietrich M.D.

## 2024-06-14 NOTE — ED PROVIDER NOTES
HPI   Chief Complaint   Patient presents with    Flu Symptoms       38-year-old female presented emergency department with a chief complaint of generalized fatigue has been ongoing for the last month.  She was seen and evaluated by her primary physician.  Had a negative mononucleosis screen but her Temi-Barr virus distribution was in a pattern that indicated previous infection versus late phase infection.  She had her thyroid evaluated.  She blood counts electrolytes.  Complaining of frontal headache and sinus pressure.  Has cough.  Denies dysuria or diarrhea.                          [place] Coma Scale Score: 15                     Patient History   Past Medical History:   Diagnosis Date    Other acute pancreatitis without necrosis or infection ([facility]) 05/03/2023    Tonsillitis 12/26/2009    Vaginal yeast infection 05/03/2023     Past Surgical History:   Procedure Laterality Date    MR HEAD ANGIO WO IV CONTRAST  6/10/2023    MR HEAD ANGIO WO IV CONTRAST [facility]    MR HEAD ANGIO WO IV CONTRAST  6/10/2023    MR HEAD ANGIO WO IV CONTRAST [facility]    MR NECK ANGIO WO IV CONTRAST  6/10/2023    MR NECK ANGIO WO IV CONTRAST [facility]    OTHER SURGICAL HISTORY  04/15/2022    Ventral hernia repair laparoscopic     Family History   Problem Relation Name Age of Onset    Hypertension Mother      Stroke Mother       Social History     Tobacco Use    Smoking status: Former     Types: Cigarettes    Smokeless tobacco: Never   Substance Use Topics    Alcohol use: Not on file     Comment: occasional    Drug use: Never       Physical Exam   ED Triage Vitals [06/13/24 2145]   Temperature Heart Rate Respirations BP   36.6 °C (97.9 °F) 97 18 104/78      Pulse Ox Temp Source Heart Rate Source Patient Position   99 % Temporal Monitor Sitting      BP Location FiO2 (%)     Left arm --       Physical Exam  Vitals and nursing note reviewed.   Constitutional:       Appearance: Normal appearance.   HENT:       Head: Normocephalic.      Nose: Nose normal.      Mouth/Throat:      Mouth: Mucous membranes are moist.   Cardiovascular:      Rate and Rhythm: Normal rate and regular rhythm.      Pulses: Normal pulses.   Pulmonary:      Effort: Pulmonary effort is normal.   Abdominal:      General: Abdomen is flat.   Musculoskeletal:         General: Normal range of motion.      Cervical back: Normal range of motion.   Skin:     General: Skin is warm.   Neurological:      General: No focal deficit present.      Mental Status: She is alert and oriented to person, place, and time.   Psychiatric:         Mood and Affect: Mood normal.         ED Course & MDM   Diagnoses as of 06/13/24 2348   Facial pressure   Other fatigue       Medical Decision Making  I have seen and evaluated this patient.  Physician available for consultation.  Vital signs have been reviewed.  All laboratory and diagnostic imaging is reviewed by myself and interpreted by myself unless otherwise stated.  Additionally imaging is interpreted by radiologist.    Normal vital signs.  She has had her thyroid and blood counts and electrolytes evaluated as an outpatient.  There is question whether this represents mononucleosis, certainly could with positive Etmi bar virus and lengthy course of fatigue.  Given her development of sinus pressure and drainage concern for a secondary cellulitis.  Will treat with a short course of steroid and antibiotic.  Patient can follow-up with her primary doctor.  Released in good condition from emergent standpoint.    Labs Reviewed - No data to display  No orders to display  Medications  doxycycline (Vibramycin) capsule 100 mg (100 mg oral Given 6/13/24 2351)  predniSONE (Deltasone) tablet 60 mg (60 mg oral Given 6/13/24 2351)  Discharge Medication List as of 6/13/2024 11:48 PM    START taking these medications    doxycycline (Vibramycin) 100 mg capsule  Take 1 capsule (100 mg) by mouth 2 times a day for 10 days. Take with at least  8 ounces (large glass) of water, do not lie down for 30 minutes after, Starting Thu 6/13/2024, Until Sun 6/23/2024, Normal    methylPREDNISolone (Medrol Dospak) 4 mg tablets  Follow schedule on package instructions, Normal                  Procedure  Procedures     Suraj Parks PA-C  06/15/24 0112

## 2024-06-14 NOTE — ED TRIAGE NOTES
Pt presents to ED from home for flu-like symptoms. Patient states she has already seen the provider and has had blood work and diagnosed with mono. Patient states she is sleeping too much and wants to feel better.

## 2024-06-26 ENCOUNTER — OFFICE VISIT (OUTPATIENT)
Dept: PRIMARY CARE | Facility: CLINIC | Age: 39
End: 2024-06-26
Payer: COMMERCIAL

## 2024-06-26 VITALS
BODY MASS INDEX: 42.29 KG/M2 | WEIGHT: 262 LBS | OXYGEN SATURATION: 97 % | DIASTOLIC BLOOD PRESSURE: 76 MMHG | HEART RATE: 96 BPM | SYSTOLIC BLOOD PRESSURE: 120 MMHG | TEMPERATURE: 101.3 F

## 2024-06-26 DIAGNOSIS — R05.2 SUBACUTE COUGH: ICD-10-CM

## 2024-06-26 DIAGNOSIS — Z86.16 HISTORY OF COVID-19: ICD-10-CM

## 2024-06-26 DIAGNOSIS — R53.83 OTHER FATIGUE: ICD-10-CM

## 2024-06-26 DIAGNOSIS — J02.9 SORE THROAT: ICD-10-CM

## 2024-06-26 DIAGNOSIS — R50.9 FEVER, UNSPECIFIED FEVER CAUSE: Primary | ICD-10-CM

## 2024-06-26 DIAGNOSIS — B27.00 EBV (EPSTEIN-BARR VIRUS) VIREMIA: ICD-10-CM

## 2024-06-26 LAB
POC BINAX EXPIRATION: NORMAL
POC BINAX NOW COVID SERIAL NUMBER: NORMAL
POC RAPID STREP: NEGATIVE
POC SARS-COV-2 AG BINAX: NORMAL
PREGNANCY TEST URINE, POC: NEGATIVE

## 2024-06-26 PROCEDURE — 87811 SARS-COV-2 COVID19 W/OPTIC: CPT | Performed by: FAMILY MEDICINE

## 2024-06-26 PROCEDURE — 99214 OFFICE O/P EST MOD 30 MIN: CPT | Performed by: FAMILY MEDICINE

## 2024-06-26 PROCEDURE — 1036F TOBACCO NON-USER: CPT | Performed by: FAMILY MEDICINE

## 2024-06-26 PROCEDURE — 87880 STREP A ASSAY W/OPTIC: CPT | Mod: QW | Performed by: FAMILY MEDICINE

## 2024-06-26 PROCEDURE — 3074F SYST BP LT 130 MM HG: CPT | Performed by: FAMILY MEDICINE

## 2024-06-26 PROCEDURE — 81025 URINE PREGNANCY TEST: CPT | Performed by: FAMILY MEDICINE

## 2024-06-26 PROCEDURE — 3078F DIAST BP <80 MM HG: CPT | Performed by: FAMILY MEDICINE

## 2024-06-26 PROCEDURE — 87081 CULTURE SCREEN ONLY: CPT | Mod: WESLAB | Performed by: FAMILY MEDICINE

## 2024-06-26 PROCEDURE — 87635 SARS-COV-2 COVID-19 AMP PRB: CPT | Mod: WESLAB | Performed by: FAMILY MEDICINE

## 2024-06-26 PROCEDURE — 3008F BODY MASS INDEX DOCD: CPT | Performed by: FAMILY MEDICINE

## 2024-06-26 ASSESSMENT — COLUMBIA-SUICIDE SEVERITY RATING SCALE - C-SSRS
1. IN THE PAST MONTH, HAVE YOU WISHED YOU WERE DEAD OR WISHED YOU COULD GO TO SLEEP AND NOT WAKE UP?: NO
6. HAVE YOU EVER DONE ANYTHING, STARTED TO DO ANYTHING, OR PREPARED TO DO ANYTHING TO END YOUR LIFE?: NO
2. HAVE YOU ACTUALLY HAD ANY THOUGHTS OF KILLING YOURSELF?: NO

## 2024-06-26 ASSESSMENT — PATIENT HEALTH QUESTIONNAIRE - PHQ9
SUM OF ALL RESPONSES TO PHQ9 QUESTIONS 1 AND 2: 0
1. LITTLE INTEREST OR PLEASURE IN DOING THINGS: NOT AT ALL
2. FEELING DOWN, DEPRESSED OR HOPELESS: NOT AT ALL

## 2024-06-26 ASSESSMENT — ENCOUNTER SYMPTOMS
LOSS OF SENSATION IN FEET: 0
DEPRESSION: 0
OCCASIONAL FEELINGS OF UNSTEADINESS: 0

## 2024-06-26 ASSESSMENT — PAIN SCALES - GENERAL: PAINLEVEL: 0-NO PAIN

## 2024-06-26 NOTE — PATIENT INSTRUCTIONS
ssessment/Plan   DUE TO FEVER and COUGH and FATIGUE and SORE THROAT we are testing for both COVID and STREP.  We have asked you to remain off work until Monday to rest and have fluids and tylenol or advil and to get a CXR today to rule out bronchitis/pneumonia  We are concerned this may be a mild case of COVID and you are immunized; but if the PCR test is positive we can offer you paxlovid treatment.  Please do not hesitate to report to ER of your choice if CHEST PAIN or SHORTNESS of breath or worsening symptoms occur.    STAFF please provide a note for her to remain off work until Monday for an infectious condition.

## 2024-06-26 NOTE — LETTER
June 26, 2024     Patient: Kate Carrion   YOB: 1985   Date of Visit: 6/26/2024       To Whom It May Concern:    Kate Carrion was seen in my clinic on 6/26/2024 at 8:30 am. Please excuse Kate for her absence from work beginning today. She may return to work on Monday 7/1/2024.     If you have any questions or concerns, please don't hesitate to call.         Sincerely,         Mi Rodarte MD

## 2024-06-27 LAB — SARS-COV-2 RNA RESP QL NAA+PROBE: NOT DETECTED

## 2024-06-28 LAB — S PYO THROAT QL CULT: NORMAL
